# Patient Record
Sex: FEMALE | Race: WHITE | NOT HISPANIC OR LATINO
[De-identification: names, ages, dates, MRNs, and addresses within clinical notes are randomized per-mention and may not be internally consistent; named-entity substitution may affect disease eponyms.]

---
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
## 2018-06-25 ENCOUNTER — TRANSCRIPTION ENCOUNTER (OUTPATIENT)
Age: 18
End: 2018-06-25

## 2018-07-23 ENCOUNTER — APPOINTMENT (OUTPATIENT)
Dept: FAMILY MEDICINE | Facility: CLINIC | Age: 18
End: 2018-07-23
Payer: COMMERCIAL

## 2018-07-23 ENCOUNTER — LABORATORY RESULT (OUTPATIENT)
Age: 18
End: 2018-07-23

## 2018-07-23 VITALS
DIASTOLIC BLOOD PRESSURE: 70 MMHG | HEIGHT: 65 IN | SYSTOLIC BLOOD PRESSURE: 122 MMHG | HEART RATE: 88 BPM | WEIGHT: 168.13 LBS | OXYGEN SATURATION: 99 % | BODY MASS INDEX: 28.01 KG/M2

## 2018-07-23 DIAGNOSIS — Z00.00 ENCOUNTER FOR GENERAL ADULT MEDICAL EXAMINATION W/OUT ABNORMAL FINDINGS: ICD-10-CM

## 2018-07-23 DIAGNOSIS — Z87.09 PERSONAL HISTORY OF OTHER DISEASES OF THE RESPIRATORY SYSTEM: ICD-10-CM

## 2018-07-23 DIAGNOSIS — M54.5 LOW BACK PAIN: ICD-10-CM

## 2018-07-23 DIAGNOSIS — M25.50 PAIN IN UNSPECIFIED JOINT: ICD-10-CM

## 2018-07-23 PROCEDURE — 99385 PREV VISIT NEW AGE 18-39: CPT | Mod: 25

## 2018-07-23 PROCEDURE — 36415 COLL VENOUS BLD VENIPUNCTURE: CPT

## 2018-07-23 RX ORDER — NORETHINDRONE AND ETHINYL ESTRADIOL TABLETS 0.4-0.035
0.4-35 KIT ORAL
Qty: 3 | Refills: 3 | Status: DISCONTINUED | COMMUNITY
Start: 2017-02-24 | End: 2018-07-23

## 2018-07-23 RX ORDER — NORETHINDRONE 0.35 MG/1
0.35 TABLET ORAL DAILY
Qty: 1 | Refills: 3 | Status: DISCONTINUED | COMMUNITY
Start: 2017-06-27 | End: 2018-07-23

## 2018-07-24 LAB
25(OH)D3 SERPL-MCNC: 32.2 NG/ML
ALBUMIN SERPL ELPH-MCNC: 4.5 G/DL
ALP BLD-CCNC: 67 U/L
ALT SERPL-CCNC: 10 U/L
ANION GAP SERPL CALC-SCNC: 14 MMOL/L
AST SERPL-CCNC: 20 U/L
B BURGDOR IGG+IGM SER QL IB: NORMAL
BASOPHILS # BLD AUTO: 0.03 K/UL
BASOPHILS NFR BLD AUTO: 0.4 %
BILIRUB SERPL-MCNC: 0.7 MG/DL
BUN SERPL-MCNC: 11 MG/DL
CALCIUM SERPL-MCNC: 9.4 MG/DL
CCP AB SER IA-ACNC: 56 UNITS
CHLORIDE SERPL-SCNC: 102 MMOL/L
CHOLEST SERPL-MCNC: 179 MG/DL
CHOLEST/HDLC SERPL: 4.1 RATIO
CO2 SERPL-SCNC: 24 MMOL/L
CREAT SERPL-MCNC: 0.71 MG/DL
EOSINOPHIL # BLD AUTO: 0.21 K/UL
EOSINOPHIL NFR BLD AUTO: 2.7 %
ERYTHROCYTE [SEDIMENTATION RATE] IN BLOOD BY WESTERGREN METHOD: 21 MM/HR
GLUCOSE SERPL-MCNC: 81 MG/DL
HBA1C MFR BLD HPLC: 5 %
HCT VFR BLD CALC: 43.5 %
HDLC SERPL-MCNC: 44 MG/DL
HGB BLD-MCNC: 13.9 G/DL
IMM GRANULOCYTES NFR BLD AUTO: 0.1 %
LDLC SERPL CALC-MCNC: 120 MG/DL
LYMPHOCYTES # BLD AUTO: 2.41 K/UL
LYMPHOCYTES NFR BLD AUTO: 30.7 %
MAN DIFF?: NORMAL
MCHC RBC-ENTMCNC: 30 PG
MCHC RBC-ENTMCNC: 32 GM/DL
MCV RBC AUTO: 93.8 FL
MONOCYTES # BLD AUTO: 0.5 K/UL
MONOCYTES NFR BLD AUTO: 6.4 %
NEUTROPHILS # BLD AUTO: 4.68 K/UL
NEUTROPHILS NFR BLD AUTO: 59.7 %
PLATELET # BLD AUTO: 270 K/UL
POTASSIUM SERPL-SCNC: 3.8 MMOL/L
PROT SERPL-MCNC: 7.4 G/DL
RBC # BLD: 4.64 M/UL
RBC # FLD: 13.4 %
RF+CCP IGG SER-IMP: ABNORMAL
RHEUMATOID FACT SER QL: <10 IU/ML
SODIUM SERPL-SCNC: 140 MMOL/L
T4 SERPL-MCNC: 8.7 UG/DL
TRIGL SERPL-MCNC: 73 MG/DL
TSH SERPL-ACNC: 2.38 UIU/ML
URATE SERPL-MCNC: 2.9 MG/DL
VIT B12 SERPL-MCNC: 432 PG/ML
WBC # FLD AUTO: 7.84 K/UL

## 2018-07-25 LAB — ANA SER IF-ACNC: NEGATIVE

## 2018-08-12 ENCOUNTER — FORM ENCOUNTER (OUTPATIENT)
Age: 18
End: 2018-08-12

## 2018-08-13 ENCOUNTER — APPOINTMENT (OUTPATIENT)
Dept: MRI IMAGING | Facility: CLINIC | Age: 18
End: 2018-08-13
Payer: COMMERCIAL

## 2018-08-13 ENCOUNTER — OUTPATIENT (OUTPATIENT)
Dept: OUTPATIENT SERVICES | Facility: HOSPITAL | Age: 18
LOS: 1 days | End: 2018-08-13
Payer: COMMERCIAL

## 2018-08-13 DIAGNOSIS — Z00.8 ENCOUNTER FOR OTHER GENERAL EXAMINATION: ICD-10-CM

## 2018-08-13 PROCEDURE — 72148 MRI LUMBAR SPINE W/O DYE: CPT

## 2018-08-13 PROCEDURE — 72148 MRI LUMBAR SPINE W/O DYE: CPT | Mod: 26

## 2018-08-15 ENCOUNTER — RESULT REVIEW (OUTPATIENT)
Age: 18
End: 2018-08-15

## 2018-09-27 NOTE — ED PROVIDER NOTE - DISCHARGE DATE
Quality 226: Preventive Care And Screening: Tobacco Use: Screening And Cessation Intervention: Patient screened for tobacco and never smoked Detail Level: Zone 26-Sep-2017

## 2019-04-25 NOTE — ED PROVIDER NOTE - CONDITION AT DISCHARGE:
Patient last seen on 02/28/19, appointment pending for 0529/19.  Last refill done on 01/16/19 # 90 x 0 refills.  Last labs done on 01/16/19.  Rx faxed to pharmacy.    Improved

## 2019-05-20 ENCOUNTER — TRANSCRIPTION ENCOUNTER (OUTPATIENT)
Age: 19
End: 2019-05-20

## 2019-07-09 ENCOUNTER — APPOINTMENT (OUTPATIENT)
Dept: OBGYN | Facility: CLINIC | Age: 19
End: 2019-07-09
Payer: COMMERCIAL

## 2019-07-09 VITALS
WEIGHT: 170 LBS | SYSTOLIC BLOOD PRESSURE: 102 MMHG | BODY MASS INDEX: 28.32 KG/M2 | DIASTOLIC BLOOD PRESSURE: 60 MMHG | HEIGHT: 65 IN

## 2019-07-09 DIAGNOSIS — Z13.0 ENCOUNTER FOR SCREENING FOR OTHER SUSPECTED ENDOCRINE DISORDER: ICD-10-CM

## 2019-07-09 DIAGNOSIS — Z13.228 ENCOUNTER FOR SCREENING FOR OTHER SUSPECTED ENDOCRINE DISORDER: ICD-10-CM

## 2019-07-09 DIAGNOSIS — Z01.419 ENCOUNTER FOR GYNECOLOGICAL EXAMINATION (GENERAL) (ROUTINE) W/OUT ABNORMAL FINDINGS: ICD-10-CM

## 2019-07-09 DIAGNOSIS — Z13.1 ENCOUNTER FOR SCREENING FOR DIABETES MELLITUS: ICD-10-CM

## 2019-07-09 DIAGNOSIS — Z13.220 ENCOUNTER FOR SCREENING FOR LIPOID DISORDERS: ICD-10-CM

## 2019-07-09 DIAGNOSIS — Z13.29 ENCOUNTER FOR SCREENING FOR OTHER SUSPECTED ENDOCRINE DISORDER: ICD-10-CM

## 2019-07-09 LAB
BILIRUB UR QL STRIP: NORMAL
GLUCOSE UR-MCNC: NORMAL
HCG UR QL: 0.2 EU/DL
HCG UR QL: NEGATIVE
HGB UR QL STRIP.AUTO: NORMAL
KETONES UR-MCNC: NORMAL
LEUKOCYTE ESTERASE UR QL STRIP: ABNORMAL
NITRITE UR QL STRIP: NORMAL
PH UR STRIP: 8.5
PROT UR STRIP-MCNC: NORMAL
QUALITY CONTROL: YES
SP GR UR STRIP: 1.01

## 2019-07-09 PROCEDURE — XXXXX: CPT

## 2019-07-09 RX ORDER — AZITHROMYCIN 250 MG/1
250 TABLET, FILM COATED ORAL
Qty: 1 | Refills: 0 | Status: COMPLETED | COMMUNITY
Start: 2018-07-23 | End: 2019-07-09

## 2019-07-09 NOTE — HISTORY OF PRESENT ILLNESS
[___ Year(s) Ago] : [unfilled] year(s) ago [Good] : being in good health [Healthy Diet] : a healthy diet [Regular Exercise] : regular exercise [Last Pap ___] : Last cervical pap smear was [unfilled] [Reproductive Age] : is of reproductive age [Definite:  ___ (Date)] : the last menstrual period was [unfilled] [Menarche Age: ____] : age at menarche was [unfilled] [Sexually Active] : is sexually active [Yes] : yes [Male ___] : [unfilled] male [Weight Concerns] : no concerns with her weight

## 2019-07-24 ENCOUNTER — RX RENEWAL (OUTPATIENT)
Age: 19
End: 2019-07-24

## 2019-07-24 LAB
C TRACH RRNA SPEC QL NAA+PROBE: NOT DETECTED
CYTOLOGY CVX/VAG DOC THIN PREP: ABNORMAL
HAV IGM SER QL: NONREACTIVE
HBV CORE IGM SER QL: NONREACTIVE
HBV SURFACE AG SER QL: NONREACTIVE
HCV AB SER QL: NONREACTIVE
HCV S/CO RATIO: 0.09 S/CO
HIV1+2 AB SPEC QL IA.RAPID: NONREACTIVE
HSV 1+2 IGG SER IA-IMP: NEGATIVE
HSV 1+2 IGG SER IA-IMP: NEGATIVE
HSV1 IGG SER QL: <0.01 INDEX
HSV2 IGG SER QL: 0.09 INDEX
N GONORRHOEA RRNA SPEC QL NAA+PROBE: NOT DETECTED
SOURCE AMPLIFICATION: NORMAL
SOURCE TP AMPLIFICATION: NORMAL
T PALLIDUM AB SER QL IA: NEGATIVE
T VAGINALIS RRNA SPEC QL NAA+PROBE: NOT DETECTED

## 2020-01-08 ENCOUNTER — APPOINTMENT (OUTPATIENT)
Dept: OBGYN | Facility: CLINIC | Age: 20
End: 2020-01-08
Payer: COMMERCIAL

## 2020-01-08 VITALS
DIASTOLIC BLOOD PRESSURE: 72 MMHG | SYSTOLIC BLOOD PRESSURE: 102 MMHG | HEIGHT: 65 IN | BODY MASS INDEX: 28.32 KG/M2 | WEIGHT: 170 LBS

## 2020-01-08 DIAGNOSIS — Z87.42 PERSONAL HISTORY OF OTHER DISEASES OF THE FEMALE GENITAL TRACT: ICD-10-CM

## 2020-01-08 LAB
BILIRUB UR QL STRIP: NORMAL
GLUCOSE UR-MCNC: NORMAL
HCG UR QL: 0.2 EU/DL
HGB UR QL STRIP.AUTO: NORMAL
KETONES UR-MCNC: NORMAL
LEUKOCYTE ESTERASE UR QL STRIP: NORMAL
NITRITE UR QL STRIP: NORMAL
PH UR STRIP: 6
PROT UR STRIP-MCNC: NORMAL
SP GR UR STRIP: 1.01

## 2020-01-08 PROCEDURE — 99213 OFFICE O/P EST LOW 20 MIN: CPT

## 2020-01-08 PROCEDURE — 81003 URINALYSIS AUTO W/O SCOPE: CPT | Mod: QW

## 2020-01-08 NOTE — HISTORY OF PRESENT ILLNESS
[6 Months Ago] : 6 months ago [Good] : being in good health [Healthy Diet] : a healthy diet [Regular Exercise] : regular exercise [Last Pap ___] : Last cervical pap smear was [unfilled] [Reproductive Age] : is of reproductive age [Pregnancy History] : pregnancy history: [Definite:  ___ (Date)] : the last menstrual period was [unfilled] [Menarche Age: ____] : age at menarche was [unfilled] [Sexually Active] : is sexually active [Monogamous] : is monogamous [Oral Contraceptives] : uses oral contraceptives [Contraception] : uses contraception [Male ___] : [unfilled] male

## 2020-01-09 ENCOUNTER — RESULT REVIEW (OUTPATIENT)
Age: 20
End: 2020-01-09

## 2020-01-09 LAB
C TRACH RRNA SPEC QL NAA+PROBE: NOT DETECTED
CANDIDA VAG CYTO: NOT DETECTED
G VAGINALIS+PREV SP MTYP VAG QL MICRO: NOT DETECTED
N GONORRHOEA RRNA SPEC QL NAA+PROBE: NOT DETECTED
SOURCE AMPLIFICATION: NORMAL
T VAGINALIS VAG QL WET PREP: NOT DETECTED

## 2020-03-31 ENCOUNTER — RX RENEWAL (OUTPATIENT)
Age: 20
End: 2020-03-31

## 2020-08-18 ENCOUNTER — APPOINTMENT (OUTPATIENT)
Dept: OBGYN | Facility: CLINIC | Age: 20
End: 2020-08-18
Payer: COMMERCIAL

## 2020-08-18 VITALS
TEMPERATURE: 97.5 F | BODY MASS INDEX: 27.66 KG/M2 | DIASTOLIC BLOOD PRESSURE: 78 MMHG | HEIGHT: 65 IN | WEIGHT: 166 LBS | SYSTOLIC BLOOD PRESSURE: 100 MMHG

## 2020-08-18 DIAGNOSIS — Z01.419 ENCOUNTER FOR GYNECOLOGICAL EXAMINATION (GENERAL) (ROUTINE) W/OUT ABNORMAL FINDINGS: ICD-10-CM

## 2020-08-18 PROCEDURE — 99395 PREV VISIT EST AGE 18-39: CPT

## 2020-08-18 PROCEDURE — 36415 COLL VENOUS BLD VENIPUNCTURE: CPT

## 2020-08-18 NOTE — HISTORY OF PRESENT ILLNESS
[Last Pap ___] : Last cervical pap smear was [unfilled] [Reproductive Age] : is of reproductive age [Last Screen ___] : last STD screen was [unfilled] [Definite:  ___ (Date)] : the last menstrual period was [unfilled] [Menarche Age: ____] : age at menarche was [unfilled] [Sexually Active] : is sexually active [Contraception] : uses contraception [Condoms] : uses condoms [Oral Contraceptives] : uses oral contraceptives [Male ___] : [unfilled] male [Yes] : yes

## 2020-08-31 LAB
C TRACH RRNA SPEC QL NAA+PROBE: NOT DETECTED
CYTOLOGY CVX/VAG DOC THIN PREP: NORMAL
HAV IGM SER QL: NONREACTIVE
HBV CORE IGM SER QL: NONREACTIVE
HBV SURFACE AG SER QL: NONREACTIVE
HCV AB SER QL: NONREACTIVE
HCV S/CO RATIO: 0.1 S/CO
HIV1+2 AB SPEC QL IA.RAPID: NONREACTIVE
HPV HIGH+LOW RISK DNA PNL CVX: NOT DETECTED
HSV 1+2 IGG SER IA-IMP: NEGATIVE
HSV 1+2 IGG SER IA-IMP: NEGATIVE
HSV1 IGG SER QL: 0.08 INDEX
HSV2 IGG SER QL: 0.08 INDEX
N GONORRHOEA RRNA SPEC QL NAA+PROBE: NOT DETECTED
SOURCE AMPLIFICATION: NORMAL
SOURCE TP AMPLIFICATION: NORMAL
T PALLIDUM AB SER QL IA: NEGATIVE
T VAGINALIS RRNA SPEC QL NAA+PROBE: NOT DETECTED

## 2020-10-30 NOTE — PHYSICAL EXAM
[Awake] : awake [Alert] : alert [Acute Distress] : no acute distress [Mass] : no breast mass [Nipple Discharge] : no nipple discharge [Axillary LAD] : no axillary lymphadenopathy [Soft] : soft [Tender] : non tender [Distended] : not distended [Oriented x3] : oriented to person, place, and time [Depressed Mood] : not depressed [Flat Affect] : affect not flat [No Lesions] : no genitalia lesions [Labia Majora Erythema] : no erythema of the labia majora [Labia Minora Erythema] : no erythema of the labia minora [Normal] : uterus [Labia Majora] : labia major [Labia Minora] : labia minora [Pink Rugae] : pink rugae [No Bleeding] : there was no active vaginal bleeding [Pap Obtained] : a Pap smear was performed [Motion Tenderness] : there was no cervical motion tenderness [Tenderness] : nontender [Enlarged ___ wks] : not enlarged [Mass ___ cm] : no uterine mass was palpated [Uterine Adnexae] : were not tender and not enlarged

## 2020-12-16 PROBLEM — Z87.09 HISTORY OF ACUTE SINUSITIS: Status: RESOLVED | Noted: 2018-07-23 | Resolved: 2020-12-16

## 2020-12-23 PROBLEM — Z01.419 ENCOUNTER FOR ANNUAL ROUTINE GYNECOLOGICAL EXAMINATION: Status: RESOLVED | Noted: 2020-08-18 | Resolved: 2020-12-23

## 2020-12-23 PROBLEM — Z87.42 HISTORY OF VAGINITIS: Status: RESOLVED | Noted: 2020-01-08 | Resolved: 2020-12-23

## 2021-07-20 ENCOUNTER — RX RENEWAL (OUTPATIENT)
Age: 21
End: 2021-07-20

## 2021-08-02 NOTE — COUNSELING
Completed cardiac clearance form/faxed to Dr. Daniel's office- [phone].  Clearance faxed to HIM \"Time Sensitive\" to be scanned into chart    [Safe Sexual Practices] : safe sexual practices

## 2021-08-16 ENCOUNTER — NON-APPOINTMENT (OUTPATIENT)
Age: 21
End: 2021-08-16

## 2021-08-21 ENCOUNTER — APPOINTMENT (OUTPATIENT)
Dept: OBGYN | Facility: CLINIC | Age: 21
End: 2021-08-21
Payer: COMMERCIAL

## 2021-08-21 ENCOUNTER — NON-APPOINTMENT (OUTPATIENT)
Age: 21
End: 2021-08-21

## 2021-08-21 VITALS
HEIGHT: 65 IN | WEIGHT: 170 LBS | DIASTOLIC BLOOD PRESSURE: 70 MMHG | BODY MASS INDEX: 28.32 KG/M2 | SYSTOLIC BLOOD PRESSURE: 110 MMHG

## 2021-08-21 DIAGNOSIS — Z11.3 ENCOUNTER FOR SCREENING FOR INFECTIONS WITH A PREDOMINANTLY SEXUAL MODE OF TRANSMISSION: ICD-10-CM

## 2021-08-21 DIAGNOSIS — B96.89 ACUTE VAGINITIS: ICD-10-CM

## 2021-08-21 DIAGNOSIS — N76.0 ACUTE VAGINITIS: ICD-10-CM

## 2021-08-21 PROCEDURE — 99395 PREV VISIT EST AGE 18-39: CPT

## 2021-08-21 PROCEDURE — 36415 COLL VENOUS BLD VENIPUNCTURE: CPT

## 2021-08-21 NOTE — HISTORY OF PRESENT ILLNESS
[Oral Contraceptive] : uses oral contraception pills [N] : Patient is not sexually active [Y] : Positive pregnancy history [No] : Patient does not have concerns regarding sex [Currently Active] : currently active [PapSmeardate] : 8/18/2020 [TextBox_4] : 21-year-old patient presents for annual gynecological exam requesting to continue oral contraceptive [TextBox_31] : wnl [LMPDate] : 7/20/2021 [PGxTotal] : 1 [PGHxAbortions] : 1 [[facility] : 0 [FreeTextEntry1] : 7/20/21

## 2021-08-28 LAB
C TRACH RRNA SPEC QL NAA+PROBE: NOT DETECTED
CYTOLOGY CVX/VAG DOC THIN PREP: NORMAL
HAV IGM SER QL: NONREACTIVE
HBV CORE IGM SER QL: NONREACTIVE
HBV SURFACE AG SER QL: NONREACTIVE
HCV AB SER QL: NONREACTIVE
HCV S/CO RATIO: 0.08 S/CO
HIV1+2 AB SPEC QL IA.RAPID: NONREACTIVE
HSV 1+2 IGG SER IA-IMP: NEGATIVE
HSV 1+2 IGG SER IA-IMP: NEGATIVE
HSV1 IGG SER QL: 0.05 INDEX
HSV2 IGG SER QL: 0.05 INDEX
N GONORRHOEA RRNA SPEC QL NAA+PROBE: NOT DETECTED
SOURCE TP AMPLIFICATION: NORMAL
T PALLIDUM AB SER QL IA: NEGATIVE

## 2021-10-11 ENCOUNTER — NON-APPOINTMENT (OUTPATIENT)
Age: 21
End: 2021-10-11

## 2021-10-23 ENCOUNTER — TRANSCRIPTION ENCOUNTER (OUTPATIENT)
Age: 21
End: 2021-10-23

## 2021-10-25 ENCOUNTER — APPOINTMENT (OUTPATIENT)
Dept: OBGYN | Facility: CLINIC | Age: 21
End: 2021-10-25
Payer: COMMERCIAL

## 2021-10-25 PROCEDURE — 99212 OFFICE O/P EST SF 10 MIN: CPT | Mod: 95

## 2021-10-25 NOTE — HISTORY OF PRESENT ILLNESS
[FreeTextEntry1] : Patient presents for telehealth visit for complaints of breakthrough bleeding on birth control pill\par Patient gives verbal permission to proceed with telehealth visit\par We discussed [facility] privacy practices remain in effect similar to an in office visit, we discussed the [facility] platform for privacy practices for telehealth visits\par We discussed that it may be deemed necessary for an in person visit\par We discussed the possibility of connection issues which may require an in person visit\par We discussed that the telehealth visit may still incur charges similar to an in person office visit\par Patient wished to proceed with telehealth visit\par \par 21-year-old G0 LMP 1 week ago presents via telehealth to discuss breakthrough bleeding she is having on her oral contraceptive in the 2nd-3rd week of pill pack despite taking correctly and denies any missed doses\par \par Rivka states she has been having breakthrough bleeding on this birth control pill intermittently on and off over the past 5 years-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  She denies any ideation-\par \par She states she wishes to monitor now that she is feeling less stressed and able to start exercising again but will call if the breakthrough bleeding persists and I will call in a prescription for a new pill\par \par Patient states she did not tolerate the minipill well in the past but other than that has only taken this birth control pill.\par \par She has a history of heavy periods prior to oral contraceptive use

## 2021-10-25 NOTE — REASON FOR VISIT
[Other Location: e.g. School (Enter Location, City,State)___] : at [unfilled], at the time of the visit. [Medical Office: ([facility])___] : at the medical office located in  [Verbal consent obtained from patient] : the patient, [unfilled] [Follow-Up] : a follow-up evaluation of [Abnormal Uterine Bleeding] : abnormal uterine bleeding

## 2021-10-25 NOTE — PLAN
[FreeTextEntry1] : Patient advised to call for any increasing or persisting signs and symptoms\par Discussed with patient the need for thyroid evaluation and lab work for any persisting breakthrough bleeding and patient verbalized good understanding stating she has a history of thyroid related issues in the past-she was recently checked and within normal limits-she will call for any persisting symptoms\par All questions answered\par 15 minutes face-to-face via telehealth

## 2021-11-12 RX ORDER — LEVONORGESTREL AND ETHINYL ESTRADIOL 0.15-0.03
0.15-3 KIT ORAL DAILY
Qty: 84 | Refills: 1 | Status: DISCONTINUED | COMMUNITY
Start: 2018-07-05 | End: 2021-11-12

## 2021-11-12 RX ORDER — LEVONORGESTREL AND ETHINYL ESTRADIOL 0.15-0.03
0.15-3 KIT ORAL DAILY
Qty: 3 | Refills: 3 | Status: DISCONTINUED | COMMUNITY
Start: 2020-08-18 | End: 2021-11-12

## 2021-11-12 RX ORDER — LEVONORGESTREL AND ETHINYL ESTRADIOL 0.15-0.03
0.15-3 KIT ORAL
Qty: 84 | Refills: 2 | Status: DISCONTINUED | COMMUNITY
Start: 2021-07-20 | End: 2021-11-12

## 2021-11-27 ENCOUNTER — TRANSCRIPTION ENCOUNTER (OUTPATIENT)
Age: 21
End: 2021-11-27

## 2022-01-22 ENCOUNTER — APPOINTMENT (OUTPATIENT)
Dept: OBGYN | Facility: CLINIC | Age: 22
End: 2022-01-22
Payer: COMMERCIAL

## 2022-01-22 VITALS
HEIGHT: 65 IN | BODY MASS INDEX: 29.32 KG/M2 | DIASTOLIC BLOOD PRESSURE: 68 MMHG | WEIGHT: 176 LBS | SYSTOLIC BLOOD PRESSURE: 110 MMHG

## 2022-01-22 DIAGNOSIS — N92.1 EXCESSIVE AND FREQUENT MENSTRUATION WITH IRREGULAR CYCLE: ICD-10-CM

## 2022-01-22 DIAGNOSIS — Z30.9 ENCOUNTER FOR CONTRACEPTIVE MANAGEMENT, UNSPECIFIED: ICD-10-CM

## 2022-01-22 PROCEDURE — 36415 COLL VENOUS BLD VENIPUNCTURE: CPT

## 2022-01-22 PROCEDURE — 99213 OFFICE O/P EST LOW 20 MIN: CPT

## 2022-01-22 NOTE — PHYSICAL EXAM
[Appropriately responsive] : appropriately responsive [Alert] : alert [No Acute Distress] : no acute distress [Oriented x3] : oriented x3 [FreeTextEntry2] : Posterior fourchette 1 cm pigmented nevi irregular border no bleeding no ulceration uniform pigmented

## 2022-01-22 NOTE — HISTORY OF PRESENT ILLNESS
[N] : Patient reports normal menses [Oral Contraceptive] : uses oral contraception pills [Y] : Positive pregnancy history [Menarche Age: ____] : age at menarche was [unfilled] [Currently Active] : currently active [No] : No [PapSmeardate] : 08/21/21 [TextBox_31] : NEG [GonorrheaDate] : 08/21/21 [TextBox_63] : NEG [ChlamydiaDate] : 08/21/21 [TextBox_68] : NEG [LMPDate] : 01/10/22 [PGxTotal] : 1 [PGHxAbortions] : 1 [[facility]] : 0 [FreeTextEntry1] : 01/10/22

## 2022-01-22 NOTE — PLAN
[FreeTextEntry1] : Advised patient that I recommend a biopsy of the pigmented nevi on the posterior fourchette given the slight atypical border-patient wishes to get a second opinion from MD -also discussed the possibility of consulting dermatology service \par \par patient states she will return with MD for biopsy versus small excision and get second opinion on the need for biopsy\par \par Differentials and precautions for atypical pigmented nevi reviewed with patient\par \par Importance of follow-up reviewed track placed\par \par \par Safe sex practices reviewed\par Birth control options reviewed together\par All questions answered\par Follow-up as needed

## 2022-01-23 LAB
T3 SERPL-MCNC: 168 NG/DL
T3FREE SERPL-MCNC: 3.69 PG/ML
T4 FREE SERPL-MCNC: 1.2 NG/DL
T4 SERPL-MCNC: 9 UG/DL
TSH SERPL-ACNC: 1.8 UIU/ML

## 2022-02-01 ENCOUNTER — APPOINTMENT (OUTPATIENT)
Dept: OBGYN | Facility: CLINIC | Age: 22
End: 2022-02-01
Payer: COMMERCIAL

## 2022-02-01 VITALS
SYSTOLIC BLOOD PRESSURE: 110 MMHG | WEIGHT: 176 LBS | HEIGHT: 65 IN | DIASTOLIC BLOOD PRESSURE: 70 MMHG | BODY MASS INDEX: 29.32 KG/M2

## 2022-02-01 PROCEDURE — 56605 BIOPSY OF VULVA/PERINEUM: CPT

## 2022-02-01 NOTE — PROCEDURE
[Vulvar Biopsy] : Vulvar Biopsy [Consent Obtained] : Consent obtained [] : on the right labia majora [Size of Biopsy Taken: ___ (mm)] : [unfilled]Umm [____ Lidocaine w/o Epi] : ~VmL lidocaine without epinephrine [Betadine] : Betadine [Sent to Pathology] : placed in buffered formalin and sent for pathology [Punch] : punch biopsy [Silver Nitrate] : silver nitrate [Tolerated Well] : the patient tolerated the procedure well [No Complications] : there were no complications

## 2022-02-03 NOTE — HISTORY OF PRESENT ILLNESS
[Gonorrhea test offered] : Gonorrhea test offered [Chlamydia test offered] : Chlamydia test offered [No] : Patient does not have concerns regarding sex [Y] : Patient uses contraception [Oral Contraceptive] : uses oral contraception pills [N] : Patient is not sexually active [Previously active] : previously active [TextBox_4] : Pt presents today for a possible vulvar biopsy.  [PapSmeardate] : 08/21/2021 [TextBox_31] : Negative [GonorrheaDate] : 01/22/2022 [TextBox_63] : Negative [ChlamydiaDate] : 01/22/2022 [TextBox_68] : Negative [LMPDate] : 01/10/2022 [PGxTotal] : 1 [PGHxAbortions] : 1 [[facility]] : 0 [FreeTextEntry1] : 01/10/2022

## 2022-02-03 NOTE — END OF VISIT
[FreeTextEntry3] : I, Endy Dunlap solely acted as scribe for Dr. Gagan Solo on 02/01/2022.\par All medical entries made by the Scribe were at my, Dr. Solo’s, direction and personally\par dictated by me on 02/01/2022.  I have reviewed the chart and agree that the record\par accurately reflects my personal performance of the history, physical exam, assessment and plan. I\par have also personally directed, reviewed, and agreed with the chart.

## 2022-02-03 NOTE — DISCUSSION/SUMMARY
[FreeTextEntry1] : Pigmented freckle noted on pelvic exam. Punch biopsy performed on the right labia. Consent was obtained. No sign of infection. No color changes or signs of melanoma. Clinical impression: melanosis vulva.\par \par Patient was encouraged to register for the portal, if not already registered. The use of the portal\par for non-emergent communication was discussed - patient is aware to avoid use for emergent\par issues. Patient will follow up in 5-7 days for biopsy results. \par \par Advised to wash normally and avoid sexual intercourse for a couple of days. \par \par She will follow up in 2 weeks and as needed.\par \par During this visit 20 minutes were spent face-to-face with greater than 50% of the time dedicated\par to counseling.

## 2022-02-03 NOTE — PHYSICAL EXAM
[Chaperone Present] : A chaperone was present in the examining room during all aspects of the physical examination [Appropriately responsive] : appropriately responsive [Alert] : alert [No Acute Distress] : no acute distress [Oriented x3] : oriented x3 [Labia Majora] : normal [Labia Minora] : normal [No Bleeding] : There was no active vaginal bleeding [Normal] : normal [Uterine Adnexae] : normal [FreeTextEntry1] : MA: Priyanka

## 2022-02-10 LAB
C TRACH RRNA SPEC QL NAA+PROBE: NOT DETECTED
N GONORRHOEA RRNA SPEC QL NAA+PROBE: NOT DETECTED
SOURCE AMPLIFICATION: NORMAL
THYROPEROXIDASE AB SERPL IA-ACNC: <10 IU/ML

## 2022-02-14 ENCOUNTER — APPOINTMENT (OUTPATIENT)
Dept: OBGYN | Facility: CLINIC | Age: 22
End: 2022-02-14
Payer: COMMERCIAL

## 2022-02-14 VITALS
SYSTOLIC BLOOD PRESSURE: 120 MMHG | DIASTOLIC BLOOD PRESSURE: 70 MMHG | HEIGHT: 65 IN | BODY MASS INDEX: 29.66 KG/M2 | WEIGHT: 178 LBS

## 2022-02-14 PROCEDURE — 99213 OFFICE O/P EST LOW 20 MIN: CPT

## 2022-03-04 ENCOUNTER — TRANSCRIPTION ENCOUNTER (OUTPATIENT)
Age: 22
End: 2022-03-04

## 2022-03-04 LAB — CORE LAB BIOPSY: NORMAL

## 2022-03-09 NOTE — PLAN
[FreeTextEntry1] : Rivka is here for a review of her vulvar biopsy and to discuss any follow up. She had a hyperpigmented vulvar lesion / macular and I  had performed a punch biopsy to rule out melanoma. The path was benign and c/w a melanotic macule, benign. I discussed the issues and the fact that she has benign vulvar melanosis, no further treatment is needed. The biopsy site has healed well.

## 2022-12-03 ENCOUNTER — APPOINTMENT (OUTPATIENT)
Dept: OBGYN | Facility: CLINIC | Age: 22
End: 2022-12-03

## 2022-12-03 ENCOUNTER — NON-APPOINTMENT (OUTPATIENT)
Age: 22
End: 2022-12-03

## 2022-12-03 VITALS
SYSTOLIC BLOOD PRESSURE: 110 MMHG | BODY MASS INDEX: 29.49 KG/M2 | HEIGHT: 65 IN | DIASTOLIC BLOOD PRESSURE: 60 MMHG | WEIGHT: 177 LBS

## 2022-12-03 DIAGNOSIS — Z01.419 ENCOUNTER FOR GYNECOLOGICAL EXAMINATION (GENERAL) (ROUTINE) W/OUT ABNORMAL FINDINGS: ICD-10-CM

## 2022-12-03 DIAGNOSIS — Z11.3 ENCOUNTER FOR SCREENING FOR INFECTIONS WITH A PREDOMINANTLY SEXUAL MODE OF TRANSMISSION: ICD-10-CM

## 2022-12-03 DIAGNOSIS — Z12.4 ENCOUNTER FOR SCREENING FOR MALIGNANT NEOPLASM OF CERVIX: ICD-10-CM

## 2022-12-03 PROCEDURE — 99395 PREV VISIT EST AGE 18-39: CPT

## 2022-12-03 PROCEDURE — 36415 COLL VENOUS BLD VENIPUNCTURE: CPT

## 2022-12-03 NOTE — HISTORY OF PRESENT ILLNESS
[Men] : men [Vaginal] : vaginal [No] : No [Oral Contraceptive] : uses oral contraception pills [Menarche Age: ____] : age at menarche was [unfilled] [N] : Patient is not sexually active [Previously active] : previously active [TextBox_4] : Annual [PapSmeardate] : 8/21/21 [TextBox_31] : neg [GonorrheaDate] : 1/22/22 [TextBox_63] : negative [LMPDate] : 11/15/2022 [PGxTotal] : 1 [PGHxFullTerm] : 0 [PGHxAbortions] : 1 [[facility]] : 0 [FreeTextEntry1] : 11/15/2022

## 2022-12-03 NOTE — COUNSELING
[Nutrition/ Exercise/ Weight Management] : nutrition, exercise, weight management [Vitamins/Supplements] : vitamins/supplements [Breast Self Exam] : breast self exam [Contraception/ Emergency Contraception/ Safe Sexual Practices] : contraception, emergency contraception, safe sexual practices [STD (testing, results, tx)] : STD (testing, results, tx) [Vaccines] : vaccines

## 2022-12-12 DIAGNOSIS — N94.9 UNSPECIFIED CONDITION ASSOCIATED WITH FEMALE GENITAL ORGANS AND MENSTRUAL CYCLE: ICD-10-CM

## 2022-12-12 DIAGNOSIS — L81.9 DISORDER OF PIGMENTATION, UNSPECIFIED: ICD-10-CM

## 2022-12-12 RX ORDER — DOXYCYCLINE HYCLATE 100 MG/1
100 CAPSULE ORAL
Qty: 14 | Refills: 0 | Status: COMPLETED | COMMUNITY
Start: 2022-07-02 | End: 2022-12-12

## 2022-12-12 RX ORDER — NORGESTIMATE AND ETHINYL ESTRADIOL 0.25-0.035
0.25-35 KIT ORAL
Qty: 3 | Refills: 2 | Status: COMPLETED | COMMUNITY
Start: 2021-11-12 | End: 2022-12-12

## 2022-12-12 RX ORDER — FLUCONAZOLE 150 MG/1
150 TABLET ORAL
Qty: 2 | Refills: 2 | Status: COMPLETED | COMMUNITY
Start: 2021-08-21 | End: 2022-12-12

## 2022-12-12 RX ORDER — PREDNISONE 10 MG/1
10 TABLET ORAL
Qty: 15 | Refills: 0 | Status: COMPLETED | COMMUNITY
Start: 2022-07-02 | End: 2022-12-12

## 2022-12-31 LAB
C TRACH RRNA SPEC QL NAA+PROBE: NOT DETECTED
HAV IGM SER QL: NONREACTIVE
HBV CORE IGM SER QL: NONREACTIVE
HBV SURFACE AG SER QL: NONREACTIVE
HCV AB SER QL: NONREACTIVE
HCV S/CO RATIO: 0.06 S/CO
HIV1+2 AB SPEC QL IA.RAPID: NONREACTIVE
N GONORRHOEA RRNA SPEC QL NAA+PROBE: NOT DETECTED
SOURCE TP AMPLIFICATION: NORMAL
T PALLIDUM AB SER QL IA: NEGATIVE

## 2022-12-31 RX ORDER — METRONIDAZOLE 7.5 MG/G
0.75 GEL VAGINAL
Qty: 1 | Refills: 0 | Status: ACTIVE | COMMUNITY
Start: 2021-08-21

## 2025-06-05 ENCOUNTER — NON-APPOINTMENT (OUTPATIENT)
Age: 25
End: 2025-06-05